# Patient Record
Sex: MALE | Race: WHITE | Employment: FULL TIME | URBAN - METROPOLITAN AREA
[De-identification: names, ages, dates, MRNs, and addresses within clinical notes are randomized per-mention and may not be internally consistent; named-entity substitution may affect disease eponyms.]

---

## 2017-07-01 ENCOUNTER — APPOINTMENT (OUTPATIENT)
Dept: GENERAL RADIOLOGY | Age: 32
End: 2017-07-01
Attending: EMERGENCY MEDICINE
Payer: COMMERCIAL

## 2017-07-01 ENCOUNTER — HOSPITAL ENCOUNTER (EMERGENCY)
Age: 32
Discharge: HOME | End: 2017-07-01
Attending: EMERGENCY MEDICINE
Payer: COMMERCIAL

## 2017-07-01 VITALS
RESPIRATION RATE: 18 BRPM | TEMPERATURE: 99 F | BODY MASS INDEX: 27.09 KG/M2 | SYSTOLIC BLOOD PRESSURE: 132 MMHG | DIASTOLIC BLOOD PRESSURE: 78 MMHG | HEART RATE: 76 BPM | OXYGEN SATURATION: 98 % | WEIGHT: 200 LBS | HEIGHT: 72 IN

## 2017-07-01 DIAGNOSIS — S20.212A CHEST WALL CONTUSION, LEFT, INITIAL ENCOUNTER: Primary | ICD-10-CM

## 2017-07-01 DIAGNOSIS — S16.1XXA CERVICAL STRAIN, INITIAL ENCOUNTER: ICD-10-CM

## 2017-07-01 DIAGNOSIS — S39.012A BACK STRAIN, INITIAL ENCOUNTER: ICD-10-CM

## 2017-07-01 PROCEDURE — 99282 EMERGENCY DEPT VISIT SF MDM: CPT

## 2017-07-02 NOTE — ED INITIAL ASSESSMENT (HPI)
Pt states, \"I was in the bouncy house, and I tried doing a flip and landed on back\". Pt c/o neck pain and upper back pain. Pt states, \"I think I strained something\". Denies LOC.

## 2017-07-02 NOTE — ED PROVIDER NOTES
Patient Seen in: [facility] Emergency Department In [facility]    History   Patient presents with:  Head Neck Injury (neurologic, musculoskeletal)  Back Pain (musculoskeletal)    Stated Complaint: was in bouncy house and injured neck/upper back, \"I think I Normocephalic, atraumatic, pupils equal round and reactive to light, oropharynx clear, uvula midline. Neck: Supple, minimal tenderness in the lower C-spine in the midline, tenderness in the left trap region.   Cardiovascular: Regular rate and rhythm, no mu patient.

## (undated) NOTE — ED AVS SNAPSHOT
THE [facility] Emergency Department in [address]  Phone:  [phone]  Fax:  [phone] [facility]   MRN: [id number]    Department:  THE [facility] Emergency Department in [place]   Date of Visit:  7/1/2017 IF THERE IS ANY CHANGE OR WORSENING OF YOUR CONDITION, CALL YOUR PRIMARY CARE PHYSICIAN AT ONCE OR RETURN IMMEDIATELY TO THE EMERGENCY DEPARTMENT.     If you have been prescribed any medication(s), please fill your prescription right away and begin taking t